# Patient Record
Sex: FEMALE | Race: WHITE | NOT HISPANIC OR LATINO
[De-identification: names, ages, dates, MRNs, and addresses within clinical notes are randomized per-mention and may not be internally consistent; named-entity substitution may affect disease eponyms.]

---

## 2020-10-02 PROBLEM — Z00.00 ENCOUNTER FOR PREVENTIVE HEALTH EXAMINATION: Status: ACTIVE | Noted: 2020-10-02

## 2020-10-05 ENCOUNTER — APPOINTMENT (OUTPATIENT)
Dept: SURGERY | Facility: CLINIC | Age: 50
End: 2020-10-05
Payer: COMMERCIAL

## 2020-10-05 VITALS — TEMPERATURE: 98.7 F

## 2020-10-05 VITALS
DIASTOLIC BLOOD PRESSURE: 79 MMHG | RESPIRATION RATE: 12 BRPM | OXYGEN SATURATION: 98 % | HEART RATE: 72 BPM | SYSTOLIC BLOOD PRESSURE: 137 MMHG

## 2020-10-05 DIAGNOSIS — Z87.891 PERSONAL HISTORY OF NICOTINE DEPENDENCE: ICD-10-CM

## 2020-10-05 DIAGNOSIS — D22.9 MELANOCYTIC NEVI, UNSPECIFIED: ICD-10-CM

## 2020-10-05 DIAGNOSIS — G35 MULTIPLE SCLEROSIS: ICD-10-CM

## 2020-10-05 DIAGNOSIS — K50.819 CROHN'S DISEASE OF BOTH SMALL AND LARGE INTESTINE WITH UNSPECIFIED COMPLICATIONS: ICD-10-CM

## 2020-10-05 PROCEDURE — 99204 OFFICE O/P NEW MOD 45 MIN: CPT

## 2020-10-05 RX ORDER — NATALIZUMAB 300 MG/15ML
300 INJECTION INTRAVENOUS
Refills: 0 | Status: ACTIVE | COMMUNITY

## 2020-10-05 RX ORDER — HYDROCODONE BITARTRATE AND ACETAMINOPHEN 5; 300 MG/1; MG/1
TABLET ORAL
Refills: 0 | Status: ACTIVE | COMMUNITY

## 2020-10-05 RX ORDER — ALPRAZOLAM 2 MG/1
TABLET ORAL
Refills: 0 | Status: ACTIVE | COMMUNITY

## 2020-10-05 NOTE — HISTORY OF PRESENT ILLNESS
[de-identified] : incisional hernia- c section [de-identified] : 50 year old white female who presents c/o an abdominal hernia.  Pt states that over the past few months she has lost a lot of weight and noticed a swelling of her lower abdomen.  It causes soreness but she has no  nausea or vomiting and no changes in her bowel habits. Pt has a history of Crohns disease but has been in remission for the last 10 years.

## 2020-10-05 NOTE — PHYSICAL EXAM
[Normal Thyroid] : the thyroid was normal [Normal Breath Sounds] : Normal breath sounds [Normal Heart Sounds] : normal heart sounds [Normal Rate and Rhythm] : normal rate and rhythm [No Rash or Lesion] : No rash or lesion [Alert] : alert [Oriented to Person] : oriented to person [Oriented to Place] : oriented to place [Oriented to Time] : oriented to time [Calm] : calm [JVD] : no jugular venous distention  [Carotid Bruits] : no carotid bruits [Abdominal Masses] : No abdominal masses [Abdomen Tenderness] : ~T ~M No abdominal tenderness [Tender] : was nontender [Enlarged] : not enlarged [de-identified] : well developed white female in no acute distress [de-identified] : noninjected and nonicteric [de-identified] : without adenopathy [de-identified] : refused [de-identified] : normal bowel sounds, without distension.\par Lower midline hernia, incarcerated\par Hard dark abdominal wall mole [de-identified] : without inguinal hernia [de-identified] : refused [de-identified] : without calf pain or swelling

## 2020-10-05 NOTE — REVIEW OF SYSTEMS
[Recent Weight Loss (___ Lbs)] : recent [unfilled] ~Ulb weight loss [Arthralgias] : arthralgias [Negative] : Heme/Lymph [Fever] : no fever [Chills] : no chills [Feeling Poorly] : not feeling poorly [Feeling Tired] : not feeling tired [Abdominal Pain] : no abdominal pain [Vomiting] : no vomiting [Constipation] : no constipation [Diarrhea] : no diarrhea [Heartburn] : no heartburn [Melena] : no melena [Joint Swelling] : no joint swelling [Joint Stiffness] : no joint stiffness [Limb Pain] : no limb pain [Limb Swelling] : no limb swelling [FreeTextEntry8] : renal colic [FreeTextEntry9] : severe back pain

## 2020-10-05 NOTE — PLAN
[FreeTextEntry1] : all risks, benefits and options discussed.  Pt wants a laparoscopic repair of her incisional hernia

## 2020-10-20 ENCOUNTER — OUTPATIENT (OUTPATIENT)
Dept: OUTPATIENT SERVICES | Facility: HOSPITAL | Age: 50
LOS: 1 days | End: 2020-10-20
Payer: COMMERCIAL

## 2020-10-20 VITALS
SYSTOLIC BLOOD PRESSURE: 114 MMHG | HEART RATE: 56 BPM | WEIGHT: 160.94 LBS | TEMPERATURE: 98 F | HEIGHT: 63 IN | RESPIRATION RATE: 16 BRPM | OXYGEN SATURATION: 100 % | DIASTOLIC BLOOD PRESSURE: 69 MMHG

## 2020-10-20 DIAGNOSIS — K43.0 INCISIONAL HERNIA WITH OBSTRUCTION, WITHOUT GANGRENE: ICD-10-CM

## 2020-10-20 DIAGNOSIS — Z90.49 ACQUIRED ABSENCE OF OTHER SPECIFIED PARTS OF DIGESTIVE TRACT: Chronic | ICD-10-CM

## 2020-10-20 DIAGNOSIS — Z01.818 ENCOUNTER FOR OTHER PREPROCEDURAL EXAMINATION: ICD-10-CM

## 2020-10-20 DIAGNOSIS — Z98.890 OTHER SPECIFIED POSTPROCEDURAL STATES: Chronic | ICD-10-CM

## 2020-10-20 DIAGNOSIS — Z98.891 HISTORY OF UTERINE SCAR FROM PREVIOUS SURGERY: Chronic | ICD-10-CM

## 2020-10-20 LAB
ANION GAP SERPL CALC-SCNC: 4 MMOL/L — LOW (ref 5–17)
APPEARANCE UR: CLEAR — SIGNIFICANT CHANGE UP
BILIRUB UR-MCNC: NEGATIVE — SIGNIFICANT CHANGE UP
BUN SERPL-MCNC: 11 MG/DL — SIGNIFICANT CHANGE UP (ref 7–23)
CALCIUM SERPL-MCNC: 9.3 MG/DL — SIGNIFICANT CHANGE UP (ref 8.5–10.1)
CHLORIDE SERPL-SCNC: 106 MMOL/L — SIGNIFICANT CHANGE UP (ref 96–108)
CO2 SERPL-SCNC: 29 MMOL/L — SIGNIFICANT CHANGE UP (ref 22–31)
COLOR SPEC: YELLOW — SIGNIFICANT CHANGE UP
CREAT SERPL-MCNC: 0.88 MG/DL — SIGNIFICANT CHANGE UP (ref 0.5–1.3)
DIFF PNL FLD: NEGATIVE — SIGNIFICANT CHANGE UP
GLUCOSE SERPL-MCNC: 81 MG/DL — SIGNIFICANT CHANGE UP (ref 70–99)
GLUCOSE UR QL: NEGATIVE — SIGNIFICANT CHANGE UP
HCG UR QL: NEGATIVE — SIGNIFICANT CHANGE UP
HCT VFR BLD CALC: 45.5 % — HIGH (ref 34.5–45)
HGB BLD-MCNC: 15.3 G/DL — SIGNIFICANT CHANGE UP (ref 11.5–15.5)
KETONES UR-MCNC: NEGATIVE — SIGNIFICANT CHANGE UP
LEUKOCYTE ESTERASE UR-ACNC: NEGATIVE — SIGNIFICANT CHANGE UP
MCHC RBC-ENTMCNC: 32.3 PG — SIGNIFICANT CHANGE UP (ref 27–34)
MCHC RBC-ENTMCNC: 33.6 GM/DL — SIGNIFICANT CHANGE UP (ref 32–36)
MCV RBC AUTO: 96 FL — SIGNIFICANT CHANGE UP (ref 80–100)
NITRITE UR-MCNC: NEGATIVE — SIGNIFICANT CHANGE UP
NRBC # BLD: 0 /100 WBCS — SIGNIFICANT CHANGE UP (ref 0–0)
PH UR: 5 — SIGNIFICANT CHANGE UP (ref 5–8)
PLATELET # BLD AUTO: 181 K/UL — SIGNIFICANT CHANGE UP (ref 150–400)
POTASSIUM SERPL-MCNC: 4.8 MMOL/L — SIGNIFICANT CHANGE UP (ref 3.5–5.3)
POTASSIUM SERPL-SCNC: 4.8 MMOL/L — SIGNIFICANT CHANGE UP (ref 3.5–5.3)
PROT UR-MCNC: NEGATIVE — SIGNIFICANT CHANGE UP
RBC # BLD: 4.74 M/UL — SIGNIFICANT CHANGE UP (ref 3.8–5.2)
RBC # FLD: 14.1 % — SIGNIFICANT CHANGE UP (ref 10.3–14.5)
SODIUM SERPL-SCNC: 139 MMOL/L — SIGNIFICANT CHANGE UP (ref 135–145)
SP GR SPEC: 1.01 — SIGNIFICANT CHANGE UP (ref 1.01–1.02)
UROBILINOGEN FLD QL: NEGATIVE — SIGNIFICANT CHANGE UP
WBC # BLD: 9.25 K/UL — SIGNIFICANT CHANGE UP (ref 3.8–10.5)
WBC # FLD AUTO: 9.25 K/UL — SIGNIFICANT CHANGE UP (ref 3.8–10.5)

## 2020-10-20 PROCEDURE — 81025 URINE PREGNANCY TEST: CPT

## 2020-10-20 PROCEDURE — 86850 RBC ANTIBODY SCREEN: CPT

## 2020-10-20 PROCEDURE — 93010 ELECTROCARDIOGRAM REPORT: CPT

## 2020-10-20 PROCEDURE — 85027 COMPLETE CBC AUTOMATED: CPT

## 2020-10-20 PROCEDURE — 81003 URINALYSIS AUTO W/O SCOPE: CPT

## 2020-10-20 PROCEDURE — 86901 BLOOD TYPING SEROLOGIC RH(D): CPT

## 2020-10-20 PROCEDURE — G0463: CPT

## 2020-10-20 PROCEDURE — 80048 BASIC METABOLIC PNL TOTAL CA: CPT

## 2020-10-20 PROCEDURE — 86900 BLOOD TYPING SEROLOGIC ABO: CPT

## 2020-10-20 PROCEDURE — 87086 URINE CULTURE/COLONY COUNT: CPT

## 2020-10-20 PROCEDURE — 36415 COLL VENOUS BLD VENIPUNCTURE: CPT

## 2020-10-20 PROCEDURE — 93005 ELECTROCARDIOGRAM TRACING: CPT

## 2020-10-20 NOTE — H&P PST ADULT - ASSESSMENT
49 y/o female, Scheduled for laparoscopic incisional hernia repair on 11/6/20. Pre op testing today.   Medical eval advised.  Pre op instructions:  Hold OTC supplements. Medications reviewed for the week and morning of surgery.  NPO after 11pm to the morning of surgery.  Shower 2 days before and the morning of surgery with antibacterial soap as instructed.  Covid testing information given.  Patient verbalized understanding.

## 2020-10-20 NOTE — H&P PST ADULT - NSICDXPASTSURGICALHX_GEN_ALL_CORE_FT
PAST SURGICAL HISTORY:  S/P breast lumpectomy cyst bilateral 1990    S/P      S/P laparoscopic cholecystectomy

## 2020-10-20 NOTE — H&P PST ADULT - HISTORY OF PRESENT ILLNESS
51 y/o female, PMH of MS on treatment, back pain disc problems, with incisional hernia since Aug 2020. Denies any pain, has some discomfort more on the left side. Seen by Dr Partida, US was done and A CT scan, diagnosed with incisional  hernia. Pre op testing today. 49 y/o female, PMH of MS on treatment, back pain disc problems, with incisional hernia since Aug 2020. Denies any pain, has some discomfort more on the left side. Seen by Dr Partida, US was done and A CT scan, diagnosed with incisional  hernia. Scheduled for laparoscopic incisional hernia repair on 11/6/20. Pre op testing today.

## 2020-10-20 NOTE — H&P PST ADULT - RS GEN PE MLT RESP DETAILS PC
no chest wall tenderness/respirations non-labored/breath sounds equal/good air movement/clear to auscultation bilaterally/airway patent/no intercostal retractions

## 2020-10-20 NOTE — H&P PST ADULT - NSICDXPROBLEM_GEN_ALL_CORE_FT
PROBLEM DIAGNOSES  Problem: Incarcerated incisional hernia  Assessment and Plan: Scheduled for laparoscopic incisional hernia repair on 11/6/20. Pre op testing today.  Covid testing within 3 days prior to surgery. Information given.

## 2020-10-21 LAB
CULTURE RESULTS: SIGNIFICANT CHANGE UP
SPECIMEN SOURCE: SIGNIFICANT CHANGE UP

## 2020-11-03 PROBLEM — G35 MULTIPLE SCLEROSIS: Chronic | Status: ACTIVE | Noted: 2020-10-20

## 2020-11-03 PROBLEM — K50.90 CROHN'S DISEASE, UNSPECIFIED, WITHOUT COMPLICATIONS: Chronic | Status: ACTIVE | Noted: 2020-10-20

## 2020-11-04 ENCOUNTER — OUTPATIENT (OUTPATIENT)
Dept: OUTPATIENT SERVICES | Facility: HOSPITAL | Age: 50
LOS: 1 days | End: 2020-11-04
Payer: COMMERCIAL

## 2020-11-04 DIAGNOSIS — Z98.890 OTHER SPECIFIED POSTPROCEDURAL STATES: Chronic | ICD-10-CM

## 2020-11-04 DIAGNOSIS — Z90.49 ACQUIRED ABSENCE OF OTHER SPECIFIED PARTS OF DIGESTIVE TRACT: Chronic | ICD-10-CM

## 2020-11-04 DIAGNOSIS — Z98.891 HISTORY OF UTERINE SCAR FROM PREVIOUS SURGERY: Chronic | ICD-10-CM

## 2020-11-04 DIAGNOSIS — Z11.59 ENCOUNTER FOR SCREENING FOR OTHER VIRAL DISEASES: ICD-10-CM

## 2020-11-04 LAB — SARS-COV-2 RNA SPEC QL NAA+PROBE: SIGNIFICANT CHANGE UP

## 2020-11-04 PROCEDURE — U0003: CPT

## 2020-11-05 ENCOUNTER — TRANSCRIPTION ENCOUNTER (OUTPATIENT)
Age: 50
End: 2020-11-05

## 2020-11-06 ENCOUNTER — APPOINTMENT (OUTPATIENT)
Dept: SURGERY | Facility: HOSPITAL | Age: 50
End: 2020-11-06

## 2020-11-06 ENCOUNTER — OUTPATIENT (OUTPATIENT)
Dept: OUTPATIENT SERVICES | Facility: HOSPITAL | Age: 50
LOS: 1 days | End: 2020-11-06
Payer: COMMERCIAL

## 2020-11-06 VITALS
WEIGHT: 160.94 LBS | SYSTOLIC BLOOD PRESSURE: 128 MMHG | DIASTOLIC BLOOD PRESSURE: 58 MMHG | HEART RATE: 56 BPM | OXYGEN SATURATION: 100 % | HEIGHT: 63 IN | RESPIRATION RATE: 16 BRPM | TEMPERATURE: 98 F

## 2020-11-06 VITALS
RESPIRATION RATE: 14 BRPM | OXYGEN SATURATION: 100 % | SYSTOLIC BLOOD PRESSURE: 122 MMHG | TEMPERATURE: 98 F | HEART RATE: 56 BPM | DIASTOLIC BLOOD PRESSURE: 58 MMHG

## 2020-11-06 DIAGNOSIS — Z98.891 HISTORY OF UTERINE SCAR FROM PREVIOUS SURGERY: Chronic | ICD-10-CM

## 2020-11-06 DIAGNOSIS — Z90.49 ACQUIRED ABSENCE OF OTHER SPECIFIED PARTS OF DIGESTIVE TRACT: Chronic | ICD-10-CM

## 2020-11-06 DIAGNOSIS — Z98.890 OTHER SPECIFIED POSTPROCEDURAL STATES: Chronic | ICD-10-CM

## 2020-11-06 DIAGNOSIS — K43.0 INCISIONAL HERNIA WITH OBSTRUCTION, WITHOUT GANGRENE: ICD-10-CM

## 2020-11-06 LAB — HCG UR QL: NEGATIVE — SIGNIFICANT CHANGE UP

## 2020-11-06 PROCEDURE — C1889: CPT

## 2020-11-06 PROCEDURE — 81025 URINE PREGNANCY TEST: CPT

## 2020-11-06 PROCEDURE — C1781: CPT

## 2020-11-06 PROCEDURE — 49655: CPT

## 2020-11-06 PROCEDURE — 49655: CPT | Mod: AS

## 2020-11-06 RX ORDER — NATALIZUMAB 300 MG/15ML
0 INJECTION INTRAVENOUS
Qty: 0 | Refills: 0 | DISCHARGE

## 2020-11-06 RX ORDER — SODIUM CHLORIDE 9 MG/ML
1000 INJECTION, SOLUTION INTRAVENOUS
Refills: 0 | Status: DISCONTINUED | OUTPATIENT
Start: 2020-11-06 | End: 2020-11-06

## 2020-11-06 RX ORDER — DOCUSATE SODIUM 100 MG
1 CAPSULE ORAL
Qty: 25 | Refills: 0
Start: 2020-11-06

## 2020-11-06 RX ORDER — HYDROMORPHONE HYDROCHLORIDE 2 MG/ML
0.5 INJECTION INTRAMUSCULAR; INTRAVENOUS; SUBCUTANEOUS
Refills: 0 | Status: DISCONTINUED | OUTPATIENT
Start: 2020-11-06 | End: 2020-11-06

## 2020-11-06 RX ORDER — OXYCODONE AND ACETAMINOPHEN 5; 325 MG/1; MG/1
1 TABLET ORAL
Qty: 20 | Refills: 0
Start: 2020-11-06

## 2020-11-06 RX ORDER — OXYCODONE HYDROCHLORIDE 5 MG/1
5 TABLET ORAL ONCE
Refills: 0 | Status: DISCONTINUED | OUTPATIENT
Start: 2020-11-06 | End: 2020-11-06

## 2020-11-06 RX ORDER — DOCUSATE SODIUM 100 MG
1 CAPSULE ORAL
Qty: 0 | Refills: 0 | DISCHARGE

## 2020-11-06 RX ORDER — CEFAZOLIN SODIUM 1 G
2000 VIAL (EA) INJECTION ONCE
Refills: 0 | Status: COMPLETED | OUTPATIENT
Start: 2020-11-06 | End: 2020-11-06

## 2020-11-06 RX ORDER — IBUPROFEN 200 MG
1 TABLET ORAL
Qty: 20 | Refills: 0
Start: 2020-11-06

## 2020-11-06 RX ORDER — ALPRAZOLAM 0.25 MG
1 TABLET ORAL
Qty: 0 | Refills: 0 | DISCHARGE

## 2020-11-06 RX ORDER — ONDANSETRON 8 MG/1
4 TABLET, FILM COATED ORAL ONCE
Refills: 0 | Status: DISCONTINUED | OUTPATIENT
Start: 2020-11-06 | End: 2020-11-06

## 2020-11-06 RX ADMIN — SODIUM CHLORIDE 75 MILLILITER(S): 9 INJECTION, SOLUTION INTRAVENOUS at 08:51

## 2020-11-06 RX ADMIN — SODIUM CHLORIDE 75 MILLILITER(S): 9 INJECTION, SOLUTION INTRAVENOUS at 13:08

## 2020-11-06 RX ADMIN — OXYCODONE HYDROCHLORIDE 5 MILLIGRAM(S): 5 TABLET ORAL at 13:37

## 2020-11-06 RX ADMIN — OXYCODONE HYDROCHLORIDE 5 MILLIGRAM(S): 5 TABLET ORAL at 13:10

## 2020-11-06 NOTE — ASU DISCHARGE PLAN (ADULT/PEDIATRIC) - ASU DC SPECIAL INSTRUCTIONSFT
Keep dressing clean, dry and intact x 48hrs hrs. After 48 hrs, you may begin showering as usual but leave dressings intact. Do NOT remove steri-strips. Do not scrub or soak incision site. Pat dry. NO tub baths, NO swimming pools. Apply waterproof ice pack to incision area 20 mins on, 20 mins off to help decrease pain and swelling.    Call Dr. Partida's office for an appointment for follow up in 1 week. Keep dressing clean, dry and intact x 48hrs hrs. After 48 hrs, you may begin showering as usual but leave dressings intact. Do NOT remove steri-strips. Do not scrub or soak incision site. Pat dry. NO tub baths, NO swimming pools. Apply waterproof ice pack to incision area 20 mins on, 20 mins off to help decrease pain and swelling.    Call Dr. Partida's office for an appointment for follow up in 1 week.    DO NOT TAKE VICODIN WITH PERCOCET. TAKE ONE OR THE OTHER FOR PAIN    Keep abdominal binder on at all times, remove for sleep and to shower

## 2020-11-06 NOTE — ASU DISCHARGE PLAN (ADULT/PEDIATRIC) - CARE PROVIDER_API CALL
Brendon aPrtida)  Surgery  [address] [place] [id number]  Phone: [phone]  Fax: [phone]  Follow Up Time:

## 2020-11-06 NOTE — BRIEF OPERATIVE NOTE - NSICDXBRIEFPROCEDURE_GEN_ALL_CORE_FT
PROCEDURES:  Repair, hernia, incisional, reducible, laparoscopic, with mesh insertion 06-Nov-2020 13:41:27  Janice Kay

## 2020-11-06 NOTE — PRE-OP CHECKLIST - ISOLATION PRECAUTIONS
Patient will come to [place] office to : (2) samples Humira.   Patient was advised of location and hours: Yes.   Patient was advised to bring photo identification: Yes.   Patient elects another party to  item: yes, name: Norma, Spouse.     none

## 2020-11-12 ENCOUNTER — APPOINTMENT (OUTPATIENT)
Dept: SURGERY | Facility: CLINIC | Age: 50
End: 2020-11-12
Payer: MEDICARE

## 2020-11-12 VITALS — TEMPERATURE: 97.3 F

## 2020-11-12 PROCEDURE — 99024 POSTOP FOLLOW-UP VISIT: CPT

## 2020-11-12 NOTE — PHYSICAL EXAM
[Normal Breath Sounds] : Normal breath sounds [Normal Heart Sounds] : normal heart sounds [Normal Rate and Rhythm] : normal rate and rhythm [Anxious] : anxious [de-identified] : well developed female in no acute distress [de-identified] : normal bowel sounds, without distension. \par Incisions clean and closed [de-identified] : without calf pain or swelling

## 2020-11-12 NOTE — HISTORY OF PRESENT ILLNESS
[de-identified] : s/p laparoscopic repair of incarcerated incisional hernia 11/6/20 [de-identified] : Pt c/o pain over the site of the hernia repair. She has had a decreased appetite and constipation.  She denies fevers or chills.

## 2020-11-19 ENCOUNTER — APPOINTMENT (OUTPATIENT)
Dept: SURGERY | Facility: CLINIC | Age: 50
End: 2020-11-19
Payer: MEDICARE

## 2020-11-19 VITALS — TEMPERATURE: 97.8 F

## 2020-11-19 PROCEDURE — 99024 POSTOP FOLLOW-UP VISIT: CPT

## 2020-11-19 NOTE — HISTORY OF PRESENT ILLNESS
[de-identified] : s/p laparoscopic repair of incarcerated incisional hernia 11/6/20 [de-identified] : Pt improved, decreased pain, without fevers or chills, with small BM./

## 2020-11-19 NOTE — PHYSICAL EXAM
[Normal Breath Sounds] : Normal breath sounds [Normal Heart Sounds] : normal heart sounds [Normal Rate and Rhythm] : normal rate and rhythm [Calm] : calm [de-identified] : well developed white female in no acute distress [de-identified] : normal bowel sounds, without distension or tenderness\par Incisions clean and closed [de-identified] : without calf pain or swelling

## 2020-12-15 ENCOUNTER — APPOINTMENT (OUTPATIENT)
Dept: SURGERY | Facility: CLINIC | Age: 50
End: 2020-12-15
Payer: MEDICARE

## 2020-12-15 VITALS — TEMPERATURE: 97.4 F

## 2020-12-15 DIAGNOSIS — K43.0 INCISIONAL HERNIA WITH OBSTRUCTION, W/OUT GANGRENE: ICD-10-CM

## 2020-12-15 PROCEDURE — 99024 POSTOP FOLLOW-UP VISIT: CPT

## 2020-12-15 NOTE — PHYSICAL EXAM
[Normal Breath Sounds] : Normal breath sounds [Normal Heart Sounds] : normal heart sounds [Normal Rate and Rhythm] : normal rate and rhythm [Calm] : calm [de-identified] : well developed female in no acute distress [de-identified] : normal bowel sounds, without distension or tenderness. repair intact\par Incisions clean and closed [de-identified] : without calf pain or swelling

## 2020-12-15 NOTE — HISTORY OF PRESENT ILLNESS
[de-identified] : s/p laparoscopic repair of incarcerated incisional hernia 11/6/20 [de-identified] : pt without complaints, normal GI functioning, without pain. no fevers or chills

## 2021-05-24 ENCOUNTER — APPOINTMENT (OUTPATIENT)
Dept: ALLERGY | Facility: CLINIC | Age: 51
End: 2021-05-24
Payer: COMMERCIAL

## 2021-05-24 ENCOUNTER — NON-APPOINTMENT (OUTPATIENT)
Age: 51
End: 2021-05-24

## 2021-05-24 VITALS
OXYGEN SATURATION: 98 % | RESPIRATION RATE: 14 BRPM | DIASTOLIC BLOOD PRESSURE: 60 MMHG | SYSTOLIC BLOOD PRESSURE: 110 MMHG | TEMPERATURE: 98.5 F | HEART RATE: 64 BPM

## 2021-05-24 PROCEDURE — 99203 OFFICE O/P NEW LOW 30 MIN: CPT

## 2021-05-24 NOTE — HISTORY OF PRESENT ILLNESS
[Asthma] : asthma [Allergic Rhinitis] : allergic rhinitis [Eczematous rashes] : eczematous rashes [Venom Reactions] : venom reactions [Food Allergies] : food allergies [de-identified] : Patient will require rotator cuff surgery and Dr. Arce would like her to be tested to mepivacaine - bupivacaine - lidocaine or ropivicaine.  Patient reports about 5 years ago she had lidocaine injection into her back and the following day she awoke with left facial swelling.  She went to neurologist's office and she is unsure if she was treated with steroids or not.   \par \par She has not had local anesthetic since that reaction.

## 2021-05-24 NOTE — ASSESSMENT
[FreeTextEntry1] : Possible allergic reaction to lidocaine - patient now requires rotator cuff surgery with nerve block.   Patient will RV for skin testing to Carbocaine (mepivacaine) or bupivacaine (Marcaine) as an alternative medication.

## 2021-05-24 NOTE — PHYSICAL EXAM
[Alert] : alert [Well Nourished] : well nourished [Healthy Appearance] : healthy appearance [No Acute Distress] : no acute distress [Well Developed] : well developed [Normal Voice/Communication] : normal voice communication [No Neck Mass] : no neck mass was observed [No LAD] : no lymphadenopathy [No Thyroid Mass] : no thyroid mass [Supple] : the neck was supple [Normal Rate] : heart rate was normal  [Normal S1, S2] : normal S1 and S2 [No murmur] : no murmur [Regular Rhythm] : with a regular rhythm [Normal Cervical Lymph Nodes] : cervical [Skin Intact] : skin intact  [No Rash] : no rash [No Cyanosis] : no cyanosis [Normal Mood] : mood was normal [Normal Affect] : affect was normal [Judgment and Insight Age Appropriate] : judgement and insight is age appropriate [Alert, Awake, Oriented as Age-Appropriate] : alert, awake, oriented as age appropriate

## 2021-05-24 NOTE — SOCIAL HISTORY
[Spouse/Partner] : spouse/partner [Dog] : dog [] :  [de-identified] : son  [FreeTextEntry2] : Disability MS - 2003  [Smokers in Household] : there are no smokers in the home

## 2021-05-27 DIAGNOSIS — Z88.9 ALLERGY STATUS TO UNSPECIFIED DRUGS, MEDICAMENTS AND BIOLOGICAL SUBSTANCES: ICD-10-CM

## 2021-05-27 RX ORDER — MEPIVACAINE HYDROCHLORIDE 20 MG/ML
2 INJECTION, SOLUTION EPIDURAL; INFILTRATION
Qty: 1 | Refills: 0 | Status: ACTIVE | COMMUNITY
Start: 2021-05-27 | End: 1900-01-01

## 2021-06-03 ENCOUNTER — APPOINTMENT (OUTPATIENT)
Dept: ALLERGY | Facility: CLINIC | Age: 51
End: 2021-06-03
Payer: COMMERCIAL

## 2021-06-03 PROCEDURE — 95018 ALL TSTG PERQ&IQ DRUGS/BIOL: CPT

## 2021-06-03 NOTE — IMPRESSION
[FreeTextEntry1] : Skin testing to Carbocaine negative and incremental challenge tolerated with no allergic reaction

## 2021-06-03 NOTE — CONSULT LETTER
[Dear  ___] : Dear  [unfilled], [Please see my note below.] : Please see my note below. [Sincerely,] : Sincerely, [FreeTextEntry1] : Kyle  [FreeTextEntry3] : Mitchell B. Boxer, M.D., FAAAAI\par [facility]\par \par Department of Allergy-Immunology\par [facility] \par [address] [place] [address]\par [place] [address]\par Tel:   [phone]\par Fax:  [phone]\par Email: [email or web address]\par

## 2021-06-03 NOTE — ASSESSMENT
[FreeTextEntry1] : Patient tolerated an incremental challenge to Carbocaine 1% without epinephrine.   She was advised that she can be treated with this local anesthetic for her planned surgical procedure.

## 2021-06-23 NOTE — H&P PST ADULT - VENOUS THROMBOEMBOLISM FOR WOMEN ONLY
(0) indicator not present
son, in co-op with 2 steps to enter, with 12 steps up to apartment./children

## 2021-07-09 DIAGNOSIS — Z01.818 ENCOUNTER FOR OTHER PREPROCEDURAL EXAMINATION: ICD-10-CM

## 2021-07-12 ENCOUNTER — APPOINTMENT (OUTPATIENT)
Dept: DISASTER EMERGENCY | Facility: CLINIC | Age: 51
End: 2021-07-12

## 2021-07-13 LAB — SARS-COV-2 N GENE NPH QL NAA+PROBE: NOT DETECTED

## 2022-04-20 ENCOUNTER — APPOINTMENT (OUTPATIENT)
Dept: ORTHOPEDIC SURGERY | Facility: CLINIC | Age: 52
End: 2022-04-20
Payer: COMMERCIAL

## 2022-04-20 PROCEDURE — 99214 OFFICE O/P EST MOD 30 MIN: CPT

## 2022-04-20 NOTE — PHYSICAL EXAM
[Right] : right shoulder [Mild] : mild [Left] : left shoulder [Sitting] : sitting [Moderate] : moderate [Orientated] : orientated [] : no sensory deficits [FreeTextEntry9] : Adduction is diminished [de-identified] : Strength was not assessed.  [de-identified] : external rotation at 90 degrees of abduction 60 degrees [TWNoteComboBox5] : internal rotation at 90 degrees of abduction 45 degrees [TWNoteComboBox4] : passive forward flexion 140 degrees [TWNoteComboBox6] : internal rotation L3 [de-identified] : external rotation 65 degrees

## 2022-04-20 NOTE — REASON FOR VISIT
[FreeTextEntry2] : Patient Complaint - This is a 51 year old RHD F on disability with right shoulder pain that started in December 2020. No injury or history. She has been on IV medications for MS through her neurologist. There have been rib fractures in 2019 without injury. Her bone density in January 2021 was normal.\par  \par DOS: 7/15/2021\par Procedure: Right Shoulder Arthroscopy, Glenohumeral Debridement, Synovectomy, Subacromial Decompression, Biceps Tenodesis, Small Rotator Cuff Repair, Distal Clavicle Resection\par Diagnosis: Small Rotator Cuff Tear, Subacromial Impingement, AC Arthralgia, Shoulder Pain, Partial Biceps Tear, SLAP tear, Glenohumeral Synovitis, Partial Bursal Rotator Cuff Tear \par \par After the 03.09.22 injection, she felt 85% better. "The injection lasted for about 5 weeks, and now its back." The burning/pulling sensation is still present. She is not in shoulder PT.

## 2022-04-20 NOTE — HISTORY OF PRESENT ILLNESS
[Left Arm] : left arm [Right Arm] : right arm [7] : 7 [9] : 9 [Burning] : burning [Stabbing] : stabbing [FreeTextEntry1] : Bilateral shoulders.  [de-identified] : PT prescribed from neurologist.

## 2022-04-20 NOTE — HISTORY OF PRESENT ILLNESS
[Left Arm] : left arm [Right Arm] : right arm [7] : 7 [9] : 9 [Burning] : burning [Stabbing] : stabbing [FreeTextEntry1] : Bilateral shoulders.  [de-identified] : PT prescribed from neurologist.

## 2022-04-20 NOTE — ASSESSMENT
[FreeTextEntry1] : We discussed her issues. \par The calcium on the left is an issue.\par An MRI is indicated.\par Medrol is prescribed. \par PT is prescribed.\par Cautions discussed. \par Questions answered.

## 2022-04-20 NOTE — PHYSICAL EXAM
[Right] : right shoulder [Mild] : mild [Left] : left shoulder [Sitting] : sitting [Moderate] : moderate [Orientated] : orientated [] : no sensory deficits [FreeTextEntry9] : Adduction is diminished [de-identified] : Strength was not assessed.  [de-identified] : external rotation at 90 degrees of abduction 60 degrees [TWNoteComboBox5] : internal rotation at 90 degrees of abduction 45 degrees [TWNoteComboBox4] : passive forward flexion 140 degrees [TWNoteComboBox6] : internal rotation L3 [de-identified] : external rotation 65 degrees

## 2022-05-11 ENCOUNTER — APPOINTMENT (OUTPATIENT)
Dept: ORTHOPEDIC SURGERY | Facility: CLINIC | Age: 52
End: 2022-05-11

## 2022-05-14 ENCOUNTER — RESULT REVIEW (OUTPATIENT)
Age: 52
End: 2022-05-14

## 2022-05-16 ENCOUNTER — APPOINTMENT (OUTPATIENT)
Dept: ORTHOPEDIC SURGERY | Facility: CLINIC | Age: 52
End: 2022-05-16
Payer: COMMERCIAL

## 2022-05-16 VITALS — HEIGHT: 63 IN | WEIGHT: 130 LBS | BODY MASS INDEX: 23.04 KG/M2

## 2022-05-16 PROCEDURE — 20610 DRAIN/INJ JOINT/BURSA W/O US: CPT

## 2022-05-16 PROCEDURE — 99214 OFFICE O/P EST MOD 30 MIN: CPT | Mod: 25

## 2022-05-16 NOTE — REASON FOR VISIT
[FreeTextEntry2] : Patient Complaint - This is a 51 year old RHD F on disability with right shoulder pain that started in December 2020. No injury or history. She has been on IV medications for MS through her neurologist. There have been rib fractures in 2019 without injury. Her bone density in January 2021 was normal.\par  \par DOS: 7/15/2021\par Procedure: Right Shoulder Arthroscopy, Glenohumeral Debridement, Synovectomy, Subacromial Decompression, Biceps Tenodesis, Small Rotator Cuff Repair, Distal Clavicle Resection\par Diagnosis: Small Rotator Cuff Tear, Subacromial Impingement, AC Arthralgia, Shoulder Pain, Partial Biceps Tear, SLAP tear, Glenohumeral Synovitis, Partial Bursal Rotator Cuff Tear \par \par After the 03.09.22 injection, she felt 85% better. "The injection lasted for about 5 weeks, and now its back." The burning/pulling sensation is still present. She is not in shoulder PT. The left shoulder pain has returned.

## 2022-05-16 NOTE — PHYSICAL EXAM
[Orientated] : orientated [Right] : right shoulder [Mild] : mild [Left] : left shoulder [Sitting] : sitting [Moderate] : moderate [] : no sensory deficits [FreeTextEntry9] : Adduction is diminished [de-identified] : Strength was not assessed.  [de-identified] : external rotation at 90 degrees of abduction 60 degrees [TWNoteComboBox5] : internal rotation at 90 degrees of abduction 45 degrees [TWNoteComboBox4] : passive forward flexion 140 degrees [TWNoteComboBox6] : internal rotation L3 [de-identified] : external rotation 65 degrees

## 2022-05-16 NOTE — DATA REVIEWED
[FreeTextEntry1] : MRI 05/14/2022: There is an infraspinatus calcium and an anterior 6mm supraspinatus partial tear. The muscle is decent

## 2022-05-16 NOTE — ASSESSMENT
[FreeTextEntry1] : We discussed her issues. \par The calcium on the left is an issue.\par PT is prescribed.\par An injection is indicated today.\par Follow up in 6 weeks.\par Cautions discussed. \par Questions answered. \par \par Patient seen by Bill Arce M.D.\par Entered by Vesna Nick acting as scribe.\par \par \par Procedure Name: Large Joint Injection / Aspiration: Depomedrol, Lidocaine and Guidance Ultrasound\par \par Large Joint Injection was performed because of pain and inflammation.\par Depomedrol: An injection of Depomedrol 40 mg , 2 cc.\par Lidocaine: An injection of Lidocaine 1 mg , 13 cc.\par \par Medication was injected in the left subacromial space. Patient has tried OTC's including aspirin, Ibuprofen, Aleve etc or prescription NSAIDS, and/or exercises at home and/ or physical therapy without satisfactory response. After verbal consent using sterile preparation and technique. The risks, benefits, and alternatives to cortisone injection were explained in full to the patient. Risks outlined include but are not limited to infection, sepsis, bleeding, scarring, skin discoloration, temporary increase in pain, syncopal episode, failure to resolve symptoms, allergic reaction, symptom recurrence, and elevation of blood sugar in diabetics. Patient understood the risks. All questions were answered. After discussion of options, patient requested an injection. Oral informed consent was obtained and sterile prep was done of the injection site. Sterile technique was utilized for the procedure including the preparation of the solutions used for the injection. Patient tolerated the procedure well. Advised to ice the injection site this evening. Prep with betadine locally to site. Sterile technique used. Patient tolerated procedure well. Post Procedure Instructions: Patient was advised to call if redness, pain, or fever occur and apply ice for 15 min. out of every hour for the next 12-24 hours as tolerated. Patient was advised to rest the joint(s) for 3 days. Ultrasound Guidance was used for the following reasons: for precise injection in area of tear. Visualization of the needle and placement of injection was performed without complication.

## 2022-05-16 NOTE — HISTORY OF PRESENT ILLNESS
[Left Arm] : left arm [Right Arm] : right arm [9] : 9 [de-identified] : pt is here for a test follow up. Pt states pain level has worsened since last visit [FreeTextEntry1] : Bilateral shoulders.

## 2022-06-03 NOTE — ASU PATIENT PROFILE, ADULT - NS PRO ABUSE SCREEN AFRAID ANYONE YN
SUBJECTIVE:    Luci Joseph is a 78 y.o. male who presents for a follow up visit. Chief Complaint   Patient presents with   [address]     Patient is here to establish care. Previous doctor retired. Went to [address] recently b/c he felt he aspirated part of an egg. Prescribed ATB and allergy medication, still has cough. Was hospitalized recently for kidney stone, had to have surgery and after developed blood clot. Hypertension  This is a chronic problem. The current episode started more than 1 year ago. The problem is controlled. Associated symptoms include shortness of breath ( chronic). Pertinent negatives include no chest pain or palpitations. Risk factors for coronary artery disease include male gender, obesity and sedentary lifestyle. Past treatments include angiotensin blockers and diuretics. The current treatment provides significant improvement. Compliance problems include exercise and diet. Cough  This is a new problem. The current episode started in the past 7 days. The problem has been unchanged. The cough is non-productive. Associated symptoms include nasal congestion and shortness of breath ( chronic). Pertinent negatives include no chest pain, postnasal drip or rhinorrhea. Nothing aggravates the symptoms. Treatments tried: Antibiotic, flonase , antihistamine, medrol. The treatment provided mild relief. DVT  Patient was diagnosed with a right lower leg DVT on 5/15/2022. His DVT was most likely secondary to postsurgical and mobility on a long drive after his surgery. Patient is just now starting to finish his 15 mg twice daily of Xarelto seem to be starting 20 mg daily. The plan is to stay on Xarelto for a full 6 months.     Kidney stone  On 5/6/2022 patient presented to [facility] in [facility] with complaints of flank pain and was found to have a 4 mm obstructing calculus in the proximal right ureter resulting in mild hydronephrosis process and perinephric and periureteral inflammatory changes. Patient was admitted and urology consulted to remove the stone. He underwent a right ureteral stent placement after laser lithotripsy. Stent was removed on his own. Patient's medications, allergies, past medical,surgical, social and family histories were reviewed and updated as appropriate. Past Medical History:   Diagnosis Date    Hypertension     Polycythemia vera ([address]     Sleep apnea     Uses Bi-Pap machine. Past Surgical History:   Procedure Laterality Date    KIDNEY STONE REMOVAL      PROSTATE SURGERY      TONSILLECTOMY       Family History   Problem Relation Age of Onset    Stroke Father     High Blood Pressure Neg Hx      Social History     Tobacco Use    Smoking status: Never Smoker    Smokeless tobacco: Never Used   Substance Use Topics    Alcohol use: Never      No Known Allergies  Current Outpatient Medications on File Prior to Visit   Medication Sig Dispense Refill    rivaroxaban (XARELTO) 15 MG TABS tablet Take 15 mg by mouth 2 times daily (with meals)      Cholecalciferol (VITAMIN D3) 1.25 MG (46323 UT) CAPS Take by mouth      candesartan-hydrochlorothiazide (ATACAND HCT) 32-12.5 MG per tablet Take 1 tablet by mouth daily        No current facility-administered medications on file prior to visit. Review of Systems   HENT: Negative for congestion, postnasal drip and rhinorrhea. Respiratory: Positive for cough and shortness of breath ( chronic). Cardiovascular: Negative for chest pain and palpitations. Gastrointestinal: Negative for abdominal pain, constipation and diarrhea. Genitourinary: Negative for difficulty urinating. Musculoskeletal: Positive for back pain ( chronic). Negative for arthralgias. Neurological: Negative for dizziness and light-headedness. Psychiatric/Behavioral: Negative for dysphoric mood and sleep disturbance. The patient is not nervous/anxious.         OBJECTIVE:    /70 Temp 97.2 °F (36.2 °C)   Ht 5' 10\" (1.778 m)   Wt 286 lb (129.7 kg)   BMI 41.04 kg/m²    Physical Exam  Constitutional:       Appearance: He is well-developed. He is obese. HENT:      Head: Normocephalic and atraumatic. Right Ear: Tympanic membrane and external ear normal.      Left Ear: Tympanic membrane and external ear normal.      Nose: Nose normal.      Mouth/Throat:      Mouth: Mucous membranes are moist.      Pharynx: No posterior oropharyngeal erythema. Eyes:      General:         Right eye: No discharge. Conjunctiva/sclera: Conjunctivae normal.   Neck:      Thyroid: No thyromegaly. Vascular: No JVD. Trachea: No tracheal deviation. Cardiovascular:      Rate and Rhythm: Normal rate and regular rhythm. Heart sounds: Normal heart sounds. Pulmonary:      Effort: Pulmonary effort is normal. No respiratory distress. Breath sounds: Normal breath sounds. No rales. Musculoskeletal:      Cervical back: Normal range of motion and neck supple. Right lower leg: No edema. Left lower leg: No edema. Lymphadenopathy:      Cervical: No cervical adenopathy. Skin:     General: Skin is warm and dry. Neurological:      Mental Status: He is alert and oriented to person, place, and time. Psychiatric:         Mood and Affect: Mood normal.         Behavior: Behavior normal.         ASSESSMENT/PLAN:    Esperanza Hughes was seen today for establish care. Diagnoses and all orders for this visit:    Essential hypertension  Good control with current medications    Obesity, Class III, BMI 40-49.9 (morbid obesity) ([address]  Hopefully patient can start exercising in the near future. Hypertensive kidney disease with stage 3b chronic kidney disease ([address]  Patient's creatinine is around 2.2. Prior to that it was normal in 2015. I have no access to levels between 2015 and last month's readings.     CALI (obstructive sleep apnea)  Patient had been doing well on BiPAP unfortunately had an aspiration of food and has persisted with a cough making it difficult to sleep through the night. Stage 3b chronic kidney disease ([facility]    Screening for malignant neoplasm of prostate  -     PSA Screening; Future    Screening for lipid disorders  -     Comprehensive Metabolic Panel, Fasting; Future  -     CBC with Auto Differential; Future  -     Lipid, Fasting; Future  -     TSH with Reflex; Future    Hyperglycemia  -     Hemoglobin A1C; Future    Cough  -     HYDROcodone-chlorpheniramine (TUSSIONEX PENNKINETIC ER) 10-8 MG/5ML SUER; Take 5 mLs by mouth every 12 hours as needed (cough) for up to 7 days. Return in about 3 months (around 9/3/2022). Please note portions of this note were completed with a voicerecognition program.  Efforts were made to edit the dictations but occasionally words are mis-transcribed. no

## 2022-06-29 ENCOUNTER — APPOINTMENT (OUTPATIENT)
Dept: ORTHOPEDIC SURGERY | Facility: CLINIC | Age: 52
End: 2022-06-29

## 2022-06-29 VITALS — HEIGHT: 63 IN | BODY MASS INDEX: 23.04 KG/M2 | WEIGHT: 130 LBS

## 2022-06-29 PROCEDURE — 99214 OFFICE O/P EST MOD 30 MIN: CPT | Mod: 25

## 2022-06-29 PROCEDURE — 20611 DRAIN/INJ JOINT/BURSA W/US: CPT

## 2022-06-29 NOTE — ASSESSMENT
[FreeTextEntry1] : We discussed the MRI findings.\par Treatment options reviewed. \par An injection of the left shoulder is indicated today. \par PT is renewed. \par Cautions discussed. \par Questions answered. \par \par Patient seen by Bill Arce M.D. \par Entered by Vesna Nick acting as scribe. \par \par Procedure Name: Large Joint Injection / Aspiration: Depomedrol, Lidocaine and Guidance Ultrasound\par \par Large Joint Injection was performed because of pain and inflammation.\par Depomedrol: An injection of Depomedrol 40 mg , 2 cc.\par Lidocaine: An injection of Lidocaine 1 mg , 13 cc.\par \par Medication was injected in the left subacromial space. Patient has tried OTC's including aspirin, Ibuprofen, Aleve etc or prescription NSAIDS, and/or exercises at home and/ or physical therapy without satisfactory response. After verbal consent using sterile preparation and technique. The risks, benefits, and alternatives to cortisone injection were explained in full to the patient. Risks outlined include but are not limited to infection, sepsis, bleeding, scarring, skin discoloration, temporary increase in pain, syncopal episode, failure to resolve symptoms, allergic reaction, symptom recurrence, and elevation of blood sugar in diabetics. Patient understood the risks. All questions were answered. After discussion of options, patient requested an injection. Oral informed consent was obtained and sterile prep was done of the injection site. Sterile technique was utilized for the procedure including the preparation of the solutions used for the injection. Patient tolerated the procedure well. Advised to ice the injection site this evening. Prep with betadine locally to site. Sterile technique used. Patient tolerated procedure well. Post Procedure Instructions: Patient was advised to call if redness, pain, or fever occur and apply ice for 15 min. out of every hour for the next 12-24 hours as tolerated. Patient was advised to rest the joint(s) for 3 days. Ultrasound Guidance was used for the following reasons: for precise injection in area of tear. Visualization of the needle and placement of injection was performed without complication.

## 2022-06-29 NOTE — DATA REVIEWED
[FreeTextEntry1] : MRI L SHOULDER 5/14/22: \par \par There is an anterior partial supraspinatus tear. There is a 1cm infraspinatus calcium. There are chondral changes. The muscle is decent. AC spurring is evident.

## 2022-06-29 NOTE — REASON FOR VISIT
[FreeTextEntry2] : This is a 52 year old RHD F on disability with right shoulder pain that started in December 2020. No injury or history. She has been on IV medications for MS through her neurologist. There have been rib fractures in 2019 without injury. Her bone density in January 2021 was normal.\par  \par DOS: 7/15/2021\par Procedure: Right Shoulder Arthroscopy, Glenohumeral Debridement, Synovectomy, Subacromial Decompression, Biceps Tenodesis, Small Rotator Cuff Repair, Distal Clavicle Resection\par Diagnosis: Small Rotator Cuff Tear, Subacromial Impingement, AC Arthralgia, Shoulder Pain, Partial Biceps Tear, SLAP tear, Glenohumeral Synovitis, Partial Bursal Rotator Cuff Tear \par \par After the 03.09.22 injection helped but didn’t last on the right.  She had the left MRI.

## 2022-06-29 NOTE — HISTORY OF PRESENT ILLNESS
[7] : 7 [8] : 8 [Burning] : burning [] : yes [Disabled] : Work status: disabled [de-identified] : Patient is here for MRI results. [FreeTextEntry1] : left shoulder [FreeTextEntry7] : down arm and up to neck [de-identified] : Physical therapy 2 x a week, HEP

## 2022-06-29 NOTE — PHYSICAL EXAM
[Orientated] : orientated [Right] : right shoulder [Mild] : mild [Left] : left shoulder [Sitting] : sitting [Moderate] : moderate [] : no sensory deficits [FreeTextEntry9] : IR was not assessed.  [de-identified] : Strength was not assessed.  [de-identified] : external rotation at 90 degrees of abduction 60 degrees [TWNoteComboBox5] : internal rotation at 90 degrees of abduction 45 degrees [TWNoteComboBox4] : passive forward flexion 160 degrees [TWNoteComboBox6] : internal rotation L3 [de-identified] : external rotation 60 degrees

## 2022-06-29 NOTE — PHYSICAL EXAM
[Orientated] : orientated [Right] : right shoulder [Mild] : mild [Left] : left shoulder [Sitting] : sitting [Moderate] : moderate [] : no sensory deficits [FreeTextEntry9] : IR was not assessed.  [de-identified] : Strength was not assessed.  [de-identified] : external rotation at 90 degrees of abduction 60 degrees [TWNoteComboBox5] : internal rotation at 90 degrees of abduction 45 degrees [TWNoteComboBox4] : passive forward flexion 160 degrees [TWNoteComboBox6] : internal rotation L3 [de-identified] : external rotation 60 degrees

## 2022-06-29 NOTE — HISTORY OF PRESENT ILLNESS
[7] : 7 [8] : 8 [Burning] : burning [] : yes [Disabled] : Work status: disabled [de-identified] : Patient is here for MRI results. [FreeTextEntry1] : left shoulder [FreeTextEntry7] : down arm and up to neck [de-identified] : Physical therapy 2 x a week, HEP

## 2022-09-28 ENCOUNTER — APPOINTMENT (OUTPATIENT)
Dept: ORTHOPEDIC SURGERY | Facility: CLINIC | Age: 52
End: 2022-09-28

## 2022-09-28 VITALS — HEIGHT: 63 IN | BODY MASS INDEX: 23.04 KG/M2 | WEIGHT: 130 LBS

## 2022-09-28 DIAGNOSIS — M75.02 ADHESIVE CAPSULITIS OF LEFT SHOULDER: ICD-10-CM

## 2022-09-28 DIAGNOSIS — M67.813 OTHER SPECIFIED DISORDERS OF TENDON, RIGHT SHOULDER: ICD-10-CM

## 2022-09-28 DIAGNOSIS — M75.31 CALCIFIC TENDINITIS OF RIGHT SHOULDER: ICD-10-CM

## 2022-09-28 DIAGNOSIS — M25.512 PAIN IN LEFT SHOULDER: ICD-10-CM

## 2022-09-28 DIAGNOSIS — M75.112 INCOMPLETE ROTATOR CUFF TEAR OR RUPTURE OF LEFT SHOULDER, NOT SPECIFIED AS TRAUMATIC: ICD-10-CM

## 2022-09-28 DIAGNOSIS — M75.32 CALCIFIC TENDINITIS OF LEFT SHOULDER: ICD-10-CM

## 2022-09-28 PROCEDURE — 99214 OFFICE O/P EST MOD 30 MIN: CPT | Mod: 25

## 2022-09-28 PROCEDURE — 20611 DRAIN/INJ JOINT/BURSA W/US: CPT

## 2022-09-28 NOTE — DATA REVIEWED
[FreeTextEntry1] : MRI L SHOULDER 5/14/22: There is an anterior partial supraspinatus tear. There is a 1cm infraspinatus calcium. There are chondral changes. The muscle is decent. AC spurring is evident.

## 2022-09-28 NOTE — HISTORY OF PRESENT ILLNESS
[8] : 8 [9] : 9 [Burning] : burning [Constant] : constant [Sleep] : sleep [Disabled] : Work status: disabled [de-identified] : Patient is here with worsening left shoulder pain.  [] : no [FreeTextEntry1] : Left shoulder [FreeTextEntry9] : gabapentin [de-identified] : HEP

## 2022-09-28 NOTE — ASSESSMENT
[FreeTextEntry1] : We discussed her course. \par She had dental work that is taking priority.\par AC injection today. \par Follow up with repeat xrays for left shoulder. \par Questions answered. \par \par Patient seen by Bill Arce M.D.\par Entered by Vesna Nick acting as scribe. \par \par \par Procedure Name: Large Joint Injection / Aspiration: Depomedrol, Lidocaine and Guidance Ultrasound\par \par Large Joint Injection was performed because of pain and inflammation.\par Depomedrol: An injection of Depomedrol 40 mg , 1 cc.\par Lidocaine: An injection of Lidocaine 1 mg , 4 cc.\par \par Medication was injected in the left AC joint. Patient has tried OTC's including aspirin, Ibuprofen, Aleve etc or prescription NSAIDS, and/or exercises at home and/ or physical therapy without satisfactory response. After verbal consent using sterile preparation and technique. The risks, benefits, and alternatives to cortisone injection were explained in full to the patient. Risks outlined include but are not limited to infection, sepsis, bleeding, scarring, skin discoloration, temporary increase in pain, syncopal episode, failure to resolve symptoms, allergic reaction, symptom recurrence, and elevation of blood sugar in diabetics. Patient understood the risks. All questions were answered. After discussion of options, patient requested an injection. Oral informed consent was obtained and sterile prep was done of the injection site. Sterile technique was utilized for the procedure including the preparation of the solutions used for the injection. Patient tolerated the procedure well. Advised to ice the injection site this evening. Prep with betadine locally to site. Sterile technique used. Patient tolerated procedure well. Post Procedure Instructions: Patient was advised to call if redness, pain, or fever occur and apply ice for 15 min. out of every hour for the next 12-24 hours as tolerated. Patient was advised to rest the joint(s) for 3 days. Ultrasound Guidance was used for the following reasons: for precise injection in area of tear. Visualization of the needle and placement of injection was performed without complication.

## 2022-09-28 NOTE — REASON FOR VISIT
[FreeTextEntry2] : This is a 52 year old RHD F on disability with right shoulder pain that started in December 2020. No injury or history. She has been on IV medications for MS through her neurologist. There have been rib fractures in 2019 without injury. Her bone density in January 2021 was normal.\par  \par DOS: 7/15/2021\par Procedure: Right Shoulder Arthroscopy, Glenohumeral Debridement, Synovectomy, Subacromial Decompression, Biceps Tenodesis, Small Rotator Cuff Repair, Distal Clavicle Resection\par Diagnosis: Small Rotator Cuff Tear, Subacromial Impingement, AC Arthralgia, Shoulder Pain, Partial Biceps Tear, SLAP tear, Glenohumeral Synovitis, Partial Bursal Rotator Cuff Tear \par \par After the 03.09.22 injection helped but didn’t last on the right.   L SA injection 6/29/22 did help, but did not last.  She still has pain and is frustrated.  Her MS medication has had bone side effects.

## 2022-09-28 NOTE — PHYSICAL EXAM
[Orientated] : orientated [Right] : right shoulder [Mild] : mild [Left] : left shoulder [Sitting] : sitting [Moderate] : moderate [] : no sensory deficits [FreeTextEntry9] : Adduction is diminished [de-identified] : Strength was not assessed.  [de-identified] : external rotation at 90 degrees of abduction 60 degrees [TWNoteComboBox5] : internal rotation at 90 degrees of abduction 45 degrees [TWNoteComboBox4] : passive forward flexion 160 degrees [TWNoteComboBox6] : internal rotation L3 [de-identified] : external rotation 60 degrees

## 2023-08-26 ENCOUNTER — APPOINTMENT (OUTPATIENT)
Dept: ORTHOPEDIC SURGERY | Facility: CLINIC | Age: 53
End: 2023-08-26
Payer: COMMERCIAL

## 2023-08-26 VITALS — BODY MASS INDEX: 23.04 KG/M2 | WEIGHT: 130 LBS | HEIGHT: 63 IN

## 2023-08-26 DIAGNOSIS — Z78.9 OTHER SPECIFIED HEALTH STATUS: ICD-10-CM

## 2023-08-26 DIAGNOSIS — M48.061 SPINAL STENOSIS, LUMBAR REGION WITHOUT NEUROGENIC CLAUDICATION: ICD-10-CM

## 2023-08-26 PROCEDURE — 99204 OFFICE O/P NEW MOD 45 MIN: CPT

## 2023-08-26 PROCEDURE — 99214 OFFICE O/P EST MOD 30 MIN: CPT

## 2023-08-26 NOTE — HISTORY OF PRESENT ILLNESS
[de-identified] : 08/26/2023 - 52 y/o F presenting with chief complaint of lower right sided back pain x 8 months. No trauma or mechanism of injury. Treatment wise, she has undergone sessions with physical therapy which does provide short term relief. Currently treating with Motrin 800 mg PRN. Current pain remains focal to the low back. Hx of sciatica in the past, this has not been an issue as of lately. She presents with imaging of Lumbar and Pelvis. DX MS.  Otherwise, no abnormalities are reported to [facility] medical [facility].    [FreeTextEntry5] : 08/26/2023 - The patient is a 53 year old female who presents today complaining of cervical/thoracic/lumbar spine pain Date of Injury/Onset: 01/2023 Pain:    At Rest: 7/10 With Activity:  8/10 Mechanism of injury: n/a Quality of symptoms: sharp, radiating  Improves with: nothing helps with pain  Worse with: overuse  Prior treatment: Physical Therapy  Prior Imaging: MRI Lumbar spine - stand up MRI  Additional Information: None

## 2023-08-26 NOTE — DATA REVIEWED
[MRI] : MRI [Lumbar Spine] : lumbar spine [Report was reviewed and noted in the chart] : The report was reviewed and noted in the chart [I independently reviewed and interpreted images and report] : I independently reviewed and interpreted images and report [I reviewed the films/CD and additionally noted] : I reviewed the films/CD and additionally noted [FreeTextEntry1] : I stop paperwork reviewed Shoulder orthopedic progress notes reviewed

## 2023-08-26 NOTE — PHYSICAL EXAM
[Normal Coordination] : normal coordination [Normal DTR UE/LE] : normal DTR UE/LE  [Normal Mood and Affect] : normal mood and affect [Normal Sensation] : normal sensation [Orientated] : orientated [Able to Communicate] : able to communicate [Normal Skin] : normal skin [No Rash] : no rash [No Ulcers] : no ulcers [No Lesions] : no lesions [No obvious lymphadenopathy in areas examined] : no obvious lymphadenopathy in areas examined [Well Developed] : well developed [Peripheral vascular exam is grossly normal] : peripheral vascular exam is grossly normal [No Respiratory Distress] : no respiratory distress [Normal Bowel Sounds] : normal bowel sounds [Lungs clear to auscultation bilaterally] : lungs clear to auscultation bilaterally [Non-Tender] : non-tender [No HSM] : no HSM [No Mass] : no mass [] : clonus not sustained at ankle

## 2023-08-26 NOTE — DISCUSSION/SUMMARY
[de-identified] : 54 y/o F with 8 months of chronic low back pain. MRI discussed and reviewed with the patient demonstrates L5S1 significant loss of disc space height and Modic changes. L3/4 facet arthropathy.  I advised the patient that I do not anticipate the findings on MRI to result in surgery. Discussed conservative treatments in the form of NSAIDs( Ibuprofrn renewed), core strengthening exercises, and continuation of physical therapy. I am referring the patient to pain management to discuss trying interventional treatment options (MBBs L3-4, ESIS, trigger point injections, discuss candidacy for Intracept L5S1)). FUV PRN.  Prior to appointment and during encounter with patient extensive medical records were reviewed including but not limited to, hospital records, outpatient records, imaging results, and lab data.During this appointment the patient was examined, diagnoses were discussed and explained in a face to face manner. In addition extensive time was spent reviewing aforementioned diagnostic studies. Counseling including abnormal image results, differential diagnoses, treatment options, risk and benefits, lifestyle changes, current condition, and current medications was performed. Patient's comments, questions, and concerns were addressed and patient verbalized understanding. Based on this patient's presentation at our office, which is an orthopedic spine surgeon's office, this patient inherently / intrinsically has a risk, however minute, of developing issues such as Cauda equina syndrome, bowel and bladder changes, or progression of motor or neurological deficits such as paralysis which may be permanent.  ANA MARTÍNEZ Acting as a Scribe for Dr. Tonio ALLAN, Ana Martínez, attest that this documentation has been prepared under the direction and in the presence of Provider Jas Elizalde MD.

## 2023-09-18 ENCOUNTER — APPOINTMENT (OUTPATIENT)
Dept: PAIN MANAGEMENT | Facility: CLINIC | Age: 53
End: 2023-09-18
Payer: MEDICARE

## 2023-09-18 VITALS — BODY MASS INDEX: 23.39 KG/M2 | WEIGHT: 132 LBS | HEIGHT: 63 IN

## 2023-09-18 DIAGNOSIS — M51.36 OTHER INTERVERTEBRAL DISC DEGENERATION, LUMBAR REGION: ICD-10-CM

## 2023-09-18 DIAGNOSIS — M54.51 VERTEBROGENIC LOW BACK PAIN: ICD-10-CM

## 2023-09-18 DIAGNOSIS — M47.816 SPONDYLOSIS W/OUT MYELOPATHY OR RADICULOPATHY, LUMBAR REGION: ICD-10-CM

## 2023-09-18 PROCEDURE — J3490M: CUSTOM

## 2023-09-18 PROCEDURE — 99204 OFFICE O/P NEW MOD 45 MIN: CPT | Mod: 25

## 2023-09-18 PROCEDURE — 20552 NJX 1/MLT TRIGGER POINT 1/2: CPT

## 2023-09-18 RX ORDER — METHYLPREDNISOLONE 4 MG/1
4 TABLET ORAL
Qty: 1 | Refills: 0 | Status: DISCONTINUED | COMMUNITY
Start: 2022-04-20 | End: 2023-09-18

## 2023-10-12 NOTE — H&P PST ADULT - DOES PATIENT HAVE ADVANCE DIRECTIVE
HISTORY OF PRESENT ILLNESS     Patient is a 89 year old with h/o atrial fibrillation s/p AVN ablation and single chamber pacemaker placement on Coumadin for anticoagulation (follows with Dr. Leonard), NSTEMI in 11/2015 with cath showing normal coronary arteries, hypertension, anxiety, h/o follicular lymphoma who presents for follow-up.  Patient presents for follow up appointment.    Today, the patient reports doing well from a cardiac standpoint. Denies any chest pain, pressure, shortness of breath, palpitations, dizziness, lightheadedness, syncope, abnormal bleeding/bruising, or lower extremity edema. Notes of having ne nosebleed recently, but has not had any reoccurrences. Able to do daily chores and activities as tolerated. She has not yet taken her blood pressure medication today. Otherwise compliant on current medication regimen. No further complaints at this time.     PAST MEDICAL, FAMILY AND SOCIAL HISTORY     The following histories were personally reviewed and updated.  Current medications, Allergies, Past Medical History and Past Surgical History    REVIEW OF SYSTEMS     Review of Systems   Constitutional: Negative for activity change, appetite change, fatigue and unexpected weight change.   Respiratory: Negative for apnea, chest tightness and shortness of breath.    Cardiovascular: Negative for chest pain, palpitations and leg swelling.   Musculoskeletal: Negative for myalgias.   Neurological: Negative for dizziness, syncope and numbness.   All other systems reviewed and are negative.    PHYSICAL EXAM   Vitals reviewed.   Constitutional:       Appearance: Healthy appearance.   HENT:      Head: Normocephalic.   Neck:      Vascular: JVD normal.   Pulmonary:      Effort: Pulmonary effort is normal.      Breath sounds: No decreased breath sounds.   Cardiovascular:      PMI at left midclavicular line. Normal rate. Regularly irregular rhythm.      Murmurs: There is a grade 2/6 systolic murmur.      No rub.    Skin:     General: Skin is warm.   Neurological:      General: No focal deficit present.      Mental Status: Alert.       Cath 11/2015:  1. Normal coronary arteries.   2. Normal LV function LVEF of 70 %.     Echo 11/2015:  Normal left ventricular size, normal systolic function and wall thickness, with mild apical hypokinesia, probably related to apical  pacing.  Mildly increased right ventricular size. Normal right ventricular systolic function.  Moderate pulmonary hypertension, RVSP 49.2 mmHg.  Dilated IVC with decreased respiratory variation.  Mild to moderate multivalvular regurgitation.  No significant change as compared to the prior study dated 5/29/2015    Echocardiogram 11/9/2016  Normal bi-ventricular size and systolic function, no LV regional wall motion abnormalities; LVEF 56%.  Severe bi-atrial enlargement, LA volume 131 ml/m².  Normal left ventricular filling pressure.  Thickened mitral valve with mild annular dilatation and mild-to-moderate central regurgitation.  Aortic valve sclerosis without stenosis and mild central regurgitation.  Mild-to-moderate tricuspid valve regurgitation.  Pacemaker lead(s) visualized in the RA/ RV.  Mild pulmonary hypertension; RVSP 42.5 mmHg.  Dilated IVC with decreased respiratory variation.  As compared to previous TTE on 11/15/2015, no significant interval changes are noted.    Echo 12/4/2020  Normal left ventricular size, systolic function and wall thickness, with no regional wall motion abnormalities. Rhythm precludes evaluation of diastolic function.  Normal right ventricular size and systolic function, RVSP 36.7 mmHg. Pacemaker lead(s) visualized.  Increased right atrial size. Severely increased left atrial volume 88.5 ml/m².  Mild aortic valve stenosis, mean gradient 5.3 mmHg, AAS 2.7 cm². Mild AI  Mildly thickened mitral valve. Moderate MR    NM Stress Test 8/27/2021  1. Myocardial perfusion scan demonstrates moderate fixed inferior septal defect probably due to  attenuation. There is no reversible myocardial stress-induced ischemia.  2. Normal post-regadenoson LV systolic function.  3. Cardiac Risk Assessment: Low.    Echo 7/24/2023  Mildly enlarged LV cavity size with mildly decreased systolic function, EF 48%.  Mild RV cavity enlargement. Normal RV systolic function, Normal RVSP 30 mmHg.  Bileaflet mitral valve prolapse. Moderate mitral regurgitation. PISA RV 47 ml, ERO 0.2 cm2.  Mild aortic valve stenosis; mean gradient 10 mmHg, ASA 1.7 cm2. Mild aortic regurgitation.  Moderate tricuspid regurgitation.  Severe biatrial enlargement.  No pericardial effusion.  Compared to the previous 12.4.2020, there LV function is reduced.    ASSESSMENT/PLAN     Atrial fibrillation s/p AVN ablation and single chamber pacemaker placement  Rate controlled with carvedilol, AC on warfarin.  Follows with Dr. Leonard. No symptoms at this time.     History of NSTEMI:   Cath normal in 11/2015.   Echo 12/2020 with normal LVEF of 55%.   NM Stress test 8/2021 showed no reversible myocardial stress-in  duced ischemia.  Denies any angina/anginal equivalents     Valvular Heart Disease:  Mild-to-moderate tricuspid regurgitation on echo in 2016.  Mild aortic valve stenosis, mean gradient 5.3 mmHg, ASA 2.7 cm². Mild AI per echo 12/2020  Mildly thickened mitral valve. Moderate MR per echo in 12/2020.  Mild aortic valve stenosis; mean gradient 10 mmHg, ASA 1.7 cm2 per echo 7/2023.  Moderate mitral regurgitation. PISA RV 47 ml, ERO 0.2 cm2 per echo 7/2023  Will continue to monitor with Echo in about 10 months.    Hypertension   BP in clinic 118/72, on recheck 120/60.  Controlled on current medication regimen.      Return in about 10 months (around 8/12/2024). Follow up sooner if needed.     On 10/12/2023, Vijaya BRADSHAW scribed the services personally performed by Sharif Laureano MD    Note:  The documentation recorded by the scribe accurately and completely reflects the service(s).  I attest that I  performed all of the medical decision-making for the \"substantive portion\" of this visit.      Sharif Laureano MD   Cardiology                No

## 2024-04-17 NOTE — H&P PST ADULT - SKIN/BREAST COMMENTS
Hieu Johnson  [facility]  [address] [place] [address]  Phone: [phone]  Fax: [phone]  Follow Up Time: 1 week    Yenny Mendes  Vascular Surgery  [address] [place] [address]  Phone: [phone]  Fax: [phone]  Follow Up Time: 2 weeks    Adiel Martinez-Raymundo  Foot Surgery  [address] [place] [address]  Phone: [phone]  Fax: [phone]  Follow Up Time: 1-3 days    Alejandro Smith  Nephrology  [address] [facility], [address] [place] [id number]  Phone: [phone]  Fax: [phone]  Follow Up Time: 1 week  
fibrocystic breasts has cyst removed few yrs ago bilateral.

## 2025-07-09 ENCOUNTER — APPOINTMENT (OUTPATIENT)
Dept: ORTHOPEDIC SURGERY | Facility: CLINIC | Age: 55
End: 2025-07-09
Payer: COMMERCIAL

## 2025-07-09 VITALS — WEIGHT: 132 LBS | HEIGHT: 63 IN | BODY MASS INDEX: 23.39 KG/M2

## 2025-07-09 PROCEDURE — 73030 X-RAY EXAM OF SHOULDER: CPT | Mod: LT

## 2025-07-09 PROCEDURE — 73010 X-RAY EXAM OF SHOULDER BLADE: CPT | Mod: LT

## 2025-07-09 PROCEDURE — 99213 OFFICE O/P EST LOW 20 MIN: CPT | Mod: 25

## 2025-07-09 PROCEDURE — 20611 DRAIN/INJ JOINT/BURSA W/US: CPT | Mod: LT
